# Patient Record
Sex: FEMALE | Race: WHITE
[De-identification: names, ages, dates, MRNs, and addresses within clinical notes are randomized per-mention and may not be internally consistent; named-entity substitution may affect disease eponyms.]

---

## 2017-01-11 NOTE — GHP
[f rep st]



                                                            HISTORY AND PHYSICAL





DATE OF ADMISSION:  2017



CHIEF COMPLAINT:  Brought in by friend.



HISTORY OF PRESENT ILLNESS:  This 71-year-old female who was found by a friend to be living in [facility], brought her in to see her primary care physician, Dr. Christina, today.  Apparently in Dr. Garrett chicas's office, she was acting quite abnormal, thus she did not feel that she was safe to return home. 
 She was thus sent to the ED for further evaluation.  According to Dr. Christina, she has had about a 2 y
ear history of dementia, the progression of which has accelerated significantly over the last 2 month
s. 



To me, she is calm.  She is actually alert and oriented x3.  She is answering questions appropriately
.  It is not clear exactly why she was here or have a good understanding of her medical problems to p
rovide a good history.  

A review of systems is quite negative overall, other than a dry cough. 



Apparently, Adult Protective Services has been involved in her care.  It is unclear exactly where the
y are at with her at this point.



PAST MEDICAL/SURGICAL HISTORY:  Hypothyroid, chronic neck pain, basal cell carcinoma.



MEDICATIONS:  Please see medication reconciliation.



ALLERGIES:  None.



FAMILY HISTORY:  Parents are .



SOCIAL HISTORY:  As per HPI.



REVIEW OF SYSTEMS:  A 10-point review of systems is conducted and is negative, except per HPI.



PHYSICAL EXAM:  VITAL SIGNS:  Blood pressure is 123/64, heart rate 65, respiration rate 14, saturatin
g 96% on room air, temperature is 36.7.  GENERAL:  The patient is a pleasant female who appears comfo
rtable, in no acute distress.  HEENT:  Shows her to be normocephalic, atraumatic.  There is no sclera
l icterus.  CARDIOVASCULAR:  Regular rate and rhythm.  No murmurs, rubs, or gallops.  PULMONARY:  Jolene
gs clear bilaterally.  ABDOMEN:  Soft, nontender, nondistended.  SKIN:  Shows her to have mild erythe
ma and scaling on her bilateral lower extremities.  :  No Singh.  NEUROLOGIC:  Shows her to be aler
t and oriented x3.  She is moving all extremities.  She has an overall nonfocal neurologic exam.  PSY
CHIATRIC:  Shows a normal mood and affect.



LABS:  CBC is relatively unremarkable.  Sodium is 133.  Urinalysis shows trace ketones, otherwise neg
ative.  I discussed this with Dr. Koehler, will admit for further care.



IMAGING:  I reviewed her head CT scan which shows nothing acute.  It does show some mild atrophy.



IMPRESSION AND PLAN:  This is a 71-year-old female admitted with essentially failure to thrive.

1.  Failure to thrive.  She was found by her friend living in [facility], sent in by her primary care ph
ysician who was very concerned about her.  This is Dr. Christina.  Will need to involve Case Management. 
 Apparently, Adult Protective Services has been involved in her care.

2.  Dementia.  Per PCP report, this has been accelerating.  I have checked a TSH, though this was onl
y slightly elevated at 6 about 2 months ago.  I have also checked a B12.

3.  Mild hyponatremia.  Will give her a bolus of 1 L, but not give her continuous IV fluids so that w
e do not tether her and cause worse in-hospital delirium.

4.  Hypothyroid.  As above, check TSH, will continue her medications.

5.  Code status.  Will need to clarify this.  I will empirically make her full code.  She did affirm 
that she has a living will.

6.  Venous thromboembolism risk.  She is at moderate risk.  I will give her Lovenox for venous thromb
oembolism prophylaxis.





Job #:  [id number]

## 2017-01-11 NOTE — CT
CT Brain (Without Contrast)   1438 hours

 

History: Acute confusion.  

 

Comparison: CT November 2012.

 

Technique: Axial computed tomographic images of the brain without contrast. Dose reduction techniques
 were utilized.

 

Findings:  Ventricles, cisterns, and sulci are widened consistent with atrophy. No hydrocephalus, mid
line shift/herniation, or epidural/subdural hematomas. No acute intraparenchymal hemorrhage or mass e
ffect. Cerebrovascular atherosclerosis.    Bone windows demonstrate no displaced fractures. Paranasal
 sinuses and mastoid air cells are clear.

 

Impression:    

1. Mild atrophy.

2. No acute hemorrhage, hydrocephalus, or mass effect. 

3. Cerebrovascular atherosclerosis.

4. No definite acute infarct.

5. No epidural or subdural hematoma.

6. Consider MRI of the brain without and with contrast enhancement, if there is continued clinical co
ncern.

 

 

Findings and recommendations discussed with Emergency Department physician, Dr. Willian Koehler, at 14
45 hours today.

 

Final report concurs with initial preliminary interpretation.

## 2017-01-11 NOTE — EDPHY
H & P


Stated Complaint: Per Dr Christina failure to thrive


Time Seen by Provider: 01/11/17 12:50


HPI/ROS: 


CHIEF COMPLAINT:  Failure to thrive





HISTORY OF PRESENT ILLNESS:  The patient presents to the emergency department 

with failure to thrive.  She has a history of expressive dementia.  She 

apparently has been living in [facility].  She had a friend bring her to her 

primary care provider's office today where she was noted to be disheveled, 

inappropriately undressing a markedly confused.  The patient is unable to tell 

me which medications she takes.  The patient does deny any acute complaints of 

pain.  She is extremely disoriented and does not know time or place.





REVIEW OF SYSTEMS:


A comprehensive 10 point review of systems is unobtainable secondary to her 

dementia


Source: Patient


Exam Limitations: No limitations





- Personal History


Current Tetanus/Diphtheria Vaccine: Unsure


Current Tetanus Diphtheria and Acellular Pertussis (TDAP): Unsure





- Medical/Surgical History


Hx Asthma: No


Hx Chronic Respiratory Disease: No


Hx Diabetes: No


Hx Cardiac Disease: No


Hx Renal Disease: No


Hx Cirrhosis: No


Hx Alcoholism: No


Hx HIV/AIDS: No


Hx Splenectomy or Spleen Trauma: No


Other PMH: unable to give information





- Social History


Smoking Status: Never smoked





- Physical Exam


Exam: 


General Appearance:  Thin frail cachectic woman, no acute distress


Eyes:  Pupils equal and round no pallor or injection


ENT, Mouth:  Mucous membranes moist


Respiratory:  There are no retractions, lungs are clear to auscultation


Cardiovascular:  Regular rate and rhythm


Gastrointestinal:  Abdomen is soft and nontender, no masses, bowel sounds normal


Neurological:  Alert and oriented x1, 5/5 strength noted all 4 extremities, 

cranial nerves 2-12 intact


Skin:  Warm and dry, no rashes


Musculoskeletal:  Neck is supple nontender


Extremities:  symmetrical, full range of motion








Constitutional: 


 Initial Vital Signs











Temperature (C)  37.0 C   01/11/17 12:19


 


Heart Rate  83   01/11/17 12:19


 


Respiratory Rate  16   01/11/17 12:19


 


Blood Pressure  122/63 H  01/11/17 12:19


 


O2 Sat (%)  95   01/11/17 12:19








 











O2 Delivery Mode               Room Air














Allergies/Adverse Reactions: 


 





No Known Allergies Allergy (Verified 01/11/17 12:25)


 








Home Medications: 














 Medication  Instructions  Recorded


 


oxyCODONE IR [Oxycodone Ir (*)] 2.5 mg PO DAILY@1800 PRN 10/08/12


 


Acetaminophen [Tylenol  mg 500 mg PO TID 01/11/17





(*)]  


 


Diclofenac Sodium [Voltaren Gel 2 gm TP QID 01/11/17





(*)]  


 


Donepezil HCl [Aricept 5 MG (*)] 5 mg PO HS 01/11/17


 


Duloxetine HCl [Cymbalta] 20 mg PO BID 01/11/17


 


Estradiol [Estrace Vaginal (*)] 0.125 maria VG Q4D@21 01/11/17


 


Glucosamine/Chondroitin 1 each PO DAILY 01/11/17





[Glucosamine/Chondroitin (*)]  


 


Herbals/Supplements -Info Only 1 ea PO DAILY 01/11/17


 


Ibuprofen [Motrin (*)] 200 mg PO BID 01/11/17


 


Levothyroxine [Synthroid 150 mcg 150 mcg PO CLARK 01/11/17





(*)]  


 


Levothyroxine [Synthroid 75 mcg 75 mcg PO MOTUWETHFRSA 01/11/17





(*)]  


 


Melatonin [Melatonin 3 MG (*)] 3 mg PO HS 01/11/17


 


traZODone [traZODONE 50MG (*)] 50 - 100 mg PO HS PRN 01/11/17














Medical Decision Making





- Diagnostics


Imaging: 


CT head without contrast:  Atrophy noted, no acute intracranial process present.


ED Course/Re-evaluation: 


The patient is referred to the emergency department from her primary care 

provider Dr. Savanna Christina.  The patient is unable to provide much history 

but in speaking with Dr. Christina, the patient has had fairly significant 

progression of her dementia and now has significant failure to thrive.  The 

patient is currently being investigated through the Adult protective Services.  

In the emergency department, the patient is noted to be in no acute distress 

she is quite deconditioned and confused.  The patient's head CT scan 

demonstrates no evidence of an acute intracranial process.  The patient's 

laboratory studies do not demonstrate an obvious acute explanation for 

encephalopathy.





Consultation is made with the hospitalist service for admission.  She will be 

admitted by Dr. Erick Billings this evening.


Differential Diagnosis: 


Differential diagnosis considered includes intracranial hemorrhage, urinary 

tract infection, dehydration, metabolic abnormality, progressive dementia





- Data Points


Laboratory Results: 


 Laboratory Results





 01/11/17 13:10 





 01/11/17 13:10 





 











  01/11/17 01/11/17





  13:10 12:45


 


WBC  5.02 10^3/uL  





   (3.80-9.50)  


 


RBC  3.90 L 10^6/uL  





   (4.18-5.33)  


 


Hgb  12.6 g/dL  





   (12.6-16.3)  


 


Hct  36.8 L %  





   (38.0-47.0)  


 


MCV  94.4 fL  





   (81.5-99.8)  


 


MCH  32.3 pg  





   (27.9-34.1)  


 


MCHC  34.2 g/dL  





   (32.4-36.7)  


 


RDW  12.3 %  





   (11.5-15.2)  


 


Plt Count  254 10^3/uL  





   (150-400)  


 


MPV  8.5 L fL  





   (8.7-11.7)  


 


Neut % (Auto)  78.1 H %  





   (39.3-74.2)  


 


Lymph % (Auto)  13.3 L %  





   (15.0-45.0)  


 


Mono % (Auto)  7.0 %  





   (4.5-13.0)  


 


Eos % (Auto)  0.2 L %  





   (0.6-7.6)  


 


Baso % (Auto)  0.6 %  





   (0.3-1.7)  


 


Nucleat RBC Rel Count  0.0 %  





   (0.0-0.2)  


 


Absolute Neuts (auto)  3.92 10^3/uL  





   (1.70-6.50)  


 


Absolute Lymphs (auto)  0.67 L 10^3/uL  





   (1.00-3.00)  


 


Absolute Monos (auto)  0.35 10^3/uL  





   (0.30-0.80)  


 


Absolute Eos (auto)  0.01 L 10^3/uL  





   (0.03-0.40)  


 


Absolute Basos (auto)  0.03 10^3/uL  





   (0.02-0.10)  


 


Absolute Nucleated RBC  0.00 10^3/uL  





   (0-0.01)  


 


Immature Gran %  0.8 %  





   (0.0-1.1)  


 


Immature Gran #  0.04 10^3/uL  





   (0.00-0.10)  


 


Sodium  133 L mEq/L  





   (134-144)  


 


Potassium  3.6 mEq/L  





   (3.5-5.2)  


 


Chloride  99 mEq/L  





   ()  


 


Carbon Dioxide  24 mEq/l  





   (22-31)  


 


Anion Gap  10 mEq/L  





   (8-16)  


 


BUN  22 mg/dL  





   (7-23)  


 


Creatinine  0.7 mg/dL  





   (0.6-1.0)  


 


Estimated GFR  > 60   





   


 


Glucose  94 mg/dL  





   ()  


 


Calcium  9.5 mg/dL  





   (8.5-10.4)  


 


Urine Color    YELLOW 





   


 


Urine Appearance    MODERATELY TURBID 





   


 


Urine pH    6.0 





    (5.0-7.5) 


 


Ur Specific Gravity    1.014 





    (1.002-1.030) 


 


Urine Protein    NEGATIVE 





    (NEGATIVE) 


 


Urine Ketones    TRACE H 





    (NEGATIVE) 


 


Urine Blood    NEGATIVE 





    (NEGATIVE) 


 


Urine Nitrate    NEGATIVE 





    (NEGATIVE) 


 


Urine Bilirubin    NEGATIVE 





    (NEGATIVE) 


 


Urine Urobilinogen    NEGATIVE EU





    (0.2-1.0) 


 


Ur Leukocyte Esterase    NEGATIVE 





    (NEGATIVE) 


 


Ur Culture Indicated?    NOT INDICATED 





    (NI) 


 


Urine Glucose    NEGATIVE 





    (NEGATIVE) 














Departure





- Departure


Disposition: [facility] Inpatient Acute


Clinical Impression: 


 Dementia, Failure to thrive


Condition: Fair

## 2017-01-12 NOTE — HOSPPROG
Hospitalist Progress Note


Assessment/Plan: 


Patient is a 71-year-old female who was brought to see her primary care 

provider yesterday.  Patient has a history of dementia which has been 

progressing.  There is concern about her in her home environment.  Adult 

protective Services is involved.  Today is my 1st encounter with the patient.  

Chart reviewed.





#.  failure to thrive


- speech therapy, physical therapy and occupation therapy to further evaluate 

her


- she has been declining steadily and Adult protective Services have been 

involved with her


-  BMI is 16 / concern she is not getting adequate intake





#.  hyponatremia


- resolved with fluids





#. anemia


- hemoglobin hematocrit lower than her baseline


- will heme her stools





#.  dementia


- has been noted by her PCP not to be decisional


- ethics to see hopefully today to help arrange for a proxy


- asked Neurology to further evaluate


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


- history of subdural hematoma in October 2012 /went to [place] [id number] rehab after 

this occurrence





#.  hypothyroidism / TSH is low at  0.4


-hold  Synthroid





#.  chronic neck pain with associated degenerative disc disease


- patient states that her neck always hurts





#.  DVT prophylaxis.  Low molecular weight heparin.





Plan:  Ethics to evaluate her/  she answers my questions appropriately but has 

no insight.  In addition I have asked for Neurology to evaluate her. 





Subjective: pat has no specific complaints.  She is complaining that there is 

too much noise in the hospital and would rather I left her alone.


Objective: 


 Vital Signs











Temp Pulse Resp BP Pulse Ox


 


 36.7 C   70   16   119/66   95 


 


 01/12/17 07:25  01/12/17 07:25  01/12/17 07:25  01/12/17 07:25  01/12/17 07:25








 Laboratory Results





 01/12/17 04:29 





 01/12/17 04:29 





 











 01/11/17 01/12/17 01/13/17





 05:59 05:59 05:59


 


Intake Total  350 


 


Balance  350 














- Physical Exam


Constitutional: no apparent distress, cachectic, No not in pain ( neck area)


Eyes: PERRL


Ears, Nose, Mouth, Throat: hearing normal


Cardiovascular: regular rate and rhythym


Respiratory: no respiratory distress


Gastrointestinal: normoactive bowel sounds


Skin: warm


Musculoskeletal: full muscle strength


Neurologic: other ( alert and oriented to herself and her friend at the 

bedside.  Knows she is in the hospital.  Does not know why she is here.  And 

prefers that I quit talking with her)


Psychiatric: thought process linear, poor insight, poor judgement





ICD10 Worksheet


Patient Problems: 


 Problems











Problem Status Diagnosed


 


Dementia Acute 


 


Failure to thrive Acute

## 2017-01-13 NOTE — HOSPPROG
Hospitalist Progress Note


Assessment/Plan: 


Patient is a 71-year-old female who was brought to see her primary care 

provider yesterday.  Patient has a history of dementia which has been 

progressing.  There is concern about her in her home environment.  Adult 

protective Services is involved.  





#.  failure to thrive


- speech therapy, physical therapy and occupation have seen her


- she has been declining steadily and Adult protective Services have been 

involved with her


-  BMI is 16 / concern she is not getting adequate intake


- she is eating well here





#.  hyponatremia


- resolved with fluids





#. anemia


- hemoglobin hematocrit lower than her baseline/repeat labs today show 

improvement


- will heme her stools





#.  dementia


- has been noted by her PCP not to be decisional


- ethics arranging for a proxy


- appreciate neurology/ she has dementia and progressive decline


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


- history of subdural hematoma in October 2012 /went to [place] [id number] rehab after 

this occurrence





#.  hypothyroidism / TSH is low at  0.4


-hold  Synthroid





#.  chronic neck pain with associated degenerative disc disease


- patient states that her neck always hurts


-no complaints today





#.  DVT prophylaxis.  Low molecular weight heparin.





Plan:  will continue care in the hospital/ she wanders frequently, but comes 

back to her room with direction.  Pat is unable to appreciate why she is her, 

she has poor insight and reasoning (while ST was working with her, she was 

unable to focus, understand how to use a pill box), she is tangential, unable 

to focus or rationalize. She perseverates on being cold and doesn't want to 

shake my hand because it may be cold. She is not decisional today when I 

evaluated her.  


Subjective: Pat has no complaints, but is cold.


Objective: 


 Vital Signs











Temp Pulse Resp BP Pulse Ox


 


 36.6 C   95   18   121/69 H  96 


 


 01/13/17 07:38  01/13/17 07:38  01/13/17 07:38  01/13/17 07:38  01/13/17 07:38








 Laboratory Results





 01/13/17 04:53 





 01/12/17 04:29 





 











 01/12/17 01/13/17 01/14/17





 05:59 05:59 05:59


 


Intake Total 350 1400 


 


Balance 350 1400 














- Physical Exam


Constitutional: no apparent distress, cachectic


Eyes: PERRL


Ears, Nose, Mouth, Throat: hearing normal


Respiratory: no respiratory distress


Skin: warm


Musculoskeletal: full muscle strength


Neurologic: other (alert and oriented to herself and place, but difficult to 

get her to focus on answering questions/she changes the topic frequently)


Psychiatric: interacting appropriately, poor insight, poor judgement





ICD10 Worksheet


Patient Problems: 


 Problems











Problem Status Diagnosed


 


Dementia Acute 


 


Failure to thrive Acute

## 2017-01-13 NOTE — PDCONSULT
Consultant Note: 


HOSPITAL NEUROLOGY CONSULT


REQUESTING: Vonnie Delacruz NP


REASON: possible dementia





HPI:





This is a 71-year-old right-handed woman with a associates degree who presented 

to our facility on January 11th with concerns for her cognitive status and well 

being.





The patient was brought to our facility for multiple concerns.  Her primary 

care provider has been concerned over the years about a progressive dementing 

process.  She was apparently found wandering the streets in her neighborhood 

without any clothes on and subsequently her home environment was survey and was 

found to be in a state of discord.  Adult protective Services has been  

investigating her ability to care for herself in her well-being.  There has 

also been concerned about protein calorie malnutrition as the patient has been 

losing weight.  She was brought here for further evaluation of her cognitive 

status as well as stabilization of her nutrition and for possible placement.





On my interview, the patient does not have any insight into her cognitive 

deficits.  She is unsure why she is admitted to our facility.  She continually 

refers to a friends who knows about her history and also refers to the 

medical chart for any sort of historical background.  She has been pacing 

around her room and has been asking to wander around the halls.  She is mainly 

concerned about the temperature of her room and staying warm, specifically 

perseverating on the temperature of the floor.  Otherwise, I cannot elicit any 

significant history from the patient due to her poor insight and judgment.





Her medical screening has been unremarkable for any toxic/metabolic/infectious 

disturbance.





ROS:


As per the HPI, otherwise a complete 12 point ROS was performed and is negative





ALLERGIES AND MEDS:


As recorded in the EMR - reviewed and reconciled





PFSH:


As per the intake H&P by Dr. Billings from 1/11/17





EXAM:


GEN: cachectic woman pacing about her room in NAD


HEENT: bitemporal wasting, sclera anicteric, conjunctiva not injected, MMM, 

oropharynx clear, no scalp tenderness


NECK: supple, nontender, no meningismus


CV: RRR s1 s2 wo m/r/c/g.  Carotid pulses 2+ wo bruit


NEURO:


MS: awake, alert, oriented to all spheres except sitaution and numerical date.  

Speech nondysarthric.  No language disturbance.  Follows commands.  Attends to 

both sides.  Clear episodic/recent memory dysfunction.  Mood euthymic.  

Adequate fund of knowledge.  Poor insight and judgement.  MoCA 17/30 (-1 clock 

draw hands, -5 attention, -1 language repeating, -5 recall, -1 date).  She is 

easily distracted and tangential.  Very poor attention.


CN: pupils 3mm round and reactive.  Intolerant of fundoscopy.  VFF.  Primary 

gaze centered.  Full ocular motility with motor impersistence and saccadic 

intrusion on smooth pursuit.  Facial sensation preserved.  Face symmetric.  

Palatoglossal movements intact.  Shoulder shrug and head turn strong.


MOTOR: reduced bulk throughout. Normal tone. Fine postural tremor without 

intention worsening.  Full power throughout.


SENSORY: intact to all modalities throughout.  No extinction.


COORD: no ataxia FN/HS.  Estela preserved.  Romberg neg.


REFLEX: plantars down.  No clonus.  DTRS trace.


GAIT: rises unassisted.  Narrow base.  Intact stride length/heel strike/toe lift

/arm swing.  Turns with 2 steps.  











DATA:


Labs reviewed in EMR.  B12 and TSH checked


CT head wo reviewed - mild volume loss, nothing acute





IMPRESSION AND RECOMMENDATIONS:





// DEMENTIA


// FAILURE TO THRIVE


// HYPOTHYROIDISM - ON REPLACEMENT





Patient with concerns of progressive cognitive decline.  She clearly has 

attentional issues and memory dysfunction.  She has no insight and poor 

judgement.  She tends to wander, as well.  I think concern for her ability to 

care for herself is valid.  She is high risk for elopement in any environment 

and I think she does pose a safety risk if left to her own devices (such as 

leaving stove burners on, leaving faucets running, poor hygiene, unable to 

maintain clean/safe living environment, wandering into unsafe situations).  

Specific subtype of dementia unclear, but likely Alzheimer type pathology.





I recommend placement in a supervised living facility.  


Delirium precautions - lights on/window shade up from [id number], frequent 

orientation, sensory optimization, regular mealtimes, OOB to chair, regular 

supervised ambulation, family/friends at bedside, nonpharmacologic sleep 

enhancement with cool/quiet/dark room at night.


Avoid sedating/disinhibiting drugs.


Nutritional optimization

## 2017-01-14 NOTE — HOSPPROG
Hospitalist Progress Note


Assessment/Plan: 


Patient is a 71-year-old female who was brought to see her primary care 

provider yesterday.  Patient has a history of dementia which has been 

progressing.  There is concern about her in her home environment.  Adult 

protective Services is involved.  





#.  failure to thrive


- speech therapy, physical therapy and occupation have seen her


- she has been declining steadily and Adult protective Services have been 

involved with her


- BMI is 16 / concern she is not getting adequate intake


- she is eating well here





#.  hyponatremia


- resolved with fluids





#. anemia


- hemoglobin hematocrit lower than her baseline/repeat labs today show 

improvement


- will heme her stools





#.  dementia


- has been noted by her PCP not to be decisional


- ethics arranging for a proxy


- appreciate neurology/ she has dementia and progressive decline


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


- history of subdural hematoma in October 2012 /went to [place] [id number] rehab after 

this occurrence





#.  hypothyroidism / TSH is low at  0.4


-hold  Synthroid





#.  chronic neck pain with associated degenerative disc disease


- patient states that her neck always hurts


- no complaints t





#.  DVT prophylaxis.  Low molecular weight heparin.





Plan:  no change/ she is alert,calm and appropriate/ continues to be tangental. 


Subjective: Pat has no complaints/ ambulates frequently


Objective: 


 Vital Signs











Temp Pulse Resp BP Pulse Ox


 


 37.0 C   79   16   116/57 L  98 


 


 01/14/17 16:00  01/14/17 16:00  01/14/17 16:00  01/14/17 16:00  01/14/17 16:00








 Laboratory Results





 01/13/17 04:53 





 01/12/17 04:29 





 











 01/13/17 01/14/17 01/15/17





 05:59 05:59 05:59


 


Intake Total 1400 800 


 


Balance 1400 800 














- Physical Exam


Constitutional: no apparent distress, other (thin)


Eyes: PERRL


Respiratory: no respiratory distress


Gastrointestinal: normoactive bowel sounds


Skin: warm


Musculoskeletal: full muscle strength


Neurologic: other (alert and oriented to herself, place)


Psychiatric: interacting appropriately, poor insight, poor judgement





ICD10 Worksheet


Patient Problems: 


 Problems











Problem Status Diagnosed


 


Dementia Acute 


 


Failure to thrive Acute

## 2017-01-15 NOTE — HOSPPROG
Hospitalist Progress Note


Assessment/Plan: 


Patient is a 71-year-old female who was brought to see her primary care 

provider yesterday.  Patient has a history of dementia which has been 

progressing.  There is concern about her in her home environment.  Adult 

protective Services is involved.  





#.  failure to thrive


- speech therapy, physical therapy and occupation have seen her


- she has been declining steadily and Adult protective Services have been 

involved with her


- BMI is 16 / concern she is not getting adequate intake


- she is eating well here





#.  hyponatremia


- resolved with fluids





#. anemia


- hemoglobin hematocrit lower than her baseline/repeat labs show improvement


- will heme her stools





#.  dementia


- has been noted by her PCP not to be decisional


- ethics arranging for a proxy


- appreciate neurology/ she has dementia and progressive decline


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


- history of subdural hematoma in October 2012 /went to [place] [address] rehab after 

this occurrence





#.  hypothyroidism / TSH is low at  0.4


-hold  Synthroid





#.  chronic neck pain with associated degenerative disc disease


- patient states that her neck always hurts


- no complaints t





#.  DVT prophylaxis.  Low molecular weight heparin.





Plan:  no change/ she is alert,calm and appropriate/ continues to be tangental. 

She is clearly not decisional/ she is oriented, but has no insight.


Subjective: patient says she didn't sleep well due to pain but unable to tell 

me where.


Objective: 


 Vital Signs











Temp Pulse Resp BP Pulse Ox


 


 36.6 C   80   12   124/70 H  97 


 


 01/15/17 08:29  01/15/17 08:29  01/15/17 08:29  01/15/17 08:29  01/15/17 08:29








 Laboratory Results





 01/13/17 04:53 





 01/12/17 04:29 





 











 01/14/17 01/15/17 01/16/17





 05:59 05:59 05:59


 


Intake Total 800  


 


Balance 800  














- Physical Exam


Constitutional: no apparent distress, other (thin)


Eyes: PERRL


Ears, Nose, Mouth, Throat: hearing normal


Respiratory: no respiratory distress


Skin: warm


Musculoskeletal: full muscle strength


Neurologic: other (alert and oriented , pacing frequently, changes the subject 

frequently)


Psychiatric: not anxious





ICD10 Worksheet


Patient Problems: 


 Problems











Problem Status Diagnosed


 


Dementia Acute 


 


Failure to thrive Acute

## 2017-01-16 NOTE — NEUROPROG
Assessment: 


Dementia with psychogenic unresponsiveness perhaps triggered by stress of 

recent events. There is not evidence for seizures or stroke. Unfortunately, 

there is nothing to do for now except monitor and support her and this will 

likely resolve on its own. 


Subjective: 


Pt case reviewed and consult from Dr. Harjit santiago from a few days ago. She has 

become unresponsive today, and I was asked to see her to be sure that there is 

no an acute neurologic disorder. The patient is not speaking and generally not 

following commands, but she has had some purposeful reactions.


Objective: 





 Vital Signs











Temp Pulse Resp BP Pulse Ox


 


 36.3 C   68   18   152/73 H  97 


 


 01/16/17 15:25  01/16/17 15:25  01/16/17 15:25  01/16/17 15:25  01/16/17 15:25








 Laboratory Results





 01/16/17 10:30 





 01/16/17 10:30 





The patient is awake with her eyes closed and non verbal. She is clearly able 

to control her upper extremities by not letting the arms fall to her face and 

purposefully moves them to her side when I lift them. She has resistance to my 

attempt to passively open her eyes. Pupils are 3mm and reactive. She did open 

her mouth for me partially. There is at least antigravity strength in all the 

extremities. Reflexes 2+. No Babinski signs.





CT head was reviewed and looks stable with chronic microvascular disease 

changes.


Allergies/Adverse Reactions: 


 





No Known Allergies Allergy (Verified 01/11/17 12:25)

## 2017-01-16 NOTE — HOSPPROG
Hospitalist Progress Note


Assessment/Plan: 


Patient is a 71-year-old female who was brought to see her primary care 

provider yesterday.  Patient has a history of dementia which has been 

progressing.  There is concern about her in her home environment.  Adult 

protective Services is involved.  





#. Change in  change in level of consciousness


- patient was up earlier today but now will not interact


- when I try to open her eyes she squints and pulls her eyelids down


- she does not pull away from a sternal rub but responded when ice was placed 

on her left groin area


- not likely a stroke /will get a stat CT now, glucose is stable, will get a 

CBC and complete metabolic panel


-spoke with Dr Owen and he will re-evaluate her





#.  failure to thrive


- speech therapy, physical therapy and occupation have seen her


- she has been declining steadily and Adult protective Services have been 

involved with her


- BMI is 16 / concern she is not getting adequate intake


- she is eating well here





#.  hyponatremia


- resolved with fluids





#. anemia


- hemoglobin hematocrit lower than her baseline





#.  dementia


- has been noted by her PCP not to be decisional


- ethics arranging for a proxy


- appreciate neurology/ she has dementia and progressive decline


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


- history of subdural hematoma in October 2012 /went to [place] [address] rehab after 

this occurrence





#.  hypothyroidism / TSH is low at  0.4


-hold  Synthroid





#.  chronic neck pain with associated degenerative disc disease


- patient states that her neck always hurts


- no complaints t





#.  DVT prophylaxis.  Low molecular weight heparin.





Plan: get a stat CT scan and labs, Dr Owen to see later today/ when stable

, can go to [facility] memory care unit/ a bed is available


Subjective: Pat is not interacting with me.


Objective: 


 Vital Signs











Temp Pulse Resp BP Pulse Ox


 


 36.3 C   88   16   139/68 H  96 


 


 01/16/17 07:46  01/16/17 09:58  01/16/17 09:58  01/16/17 09:58  01/16/17 09:58








 Laboratory Results





 01/13/17 04:53 





 01/12/17 04:29 











- Physical Exam


Constitutional: no apparent distress (vital signs are stable)


Cardiovascular: regular rate and rhythym


Respiratory: no respiratory distress


Skin: warm


Neurologic: No facial droop


Psychiatric: other (not responding, squints eyelids when I try to open her eyes

, when arm is lifted above her head, she places it to her side.)





ICD10 Worksheet


Patient Problems: 


 Problems











Problem Status Diagnosed


 


Dementia Acute 


 


Failure to thrive Acute

## 2017-01-16 NOTE — CT
CT Head Without Contrast

 

History: Unresponsive.

 

Comparison: CT head January 11, 2017.

 

Technique: Axial unenhanced images were obtained from the vertex through the skull base.  Dose reduct
ion techniques were utilized.

 

Findings: Gray-white differentiation is preserved. There is mild diffuse cerebral atrophy with scatte
red periventricular and subcortical low attenuation, suggesting chronic microvascular ischemic gliosi
s. No intracranial hemorrhage is identified.  No extraaxial fluid collections are identified.  There 
is no mass, mass effect or evidence of infarct. Atherosclerotic calcification is present in the dista
l internal carotid and vertebral arteries. The skull and skull base are unremarkable.  The paranasal 
sinuses and mastoid air cells are normally aerated. 

 

Impression:

1. No acute intracranial findings.

2. Diffuse cerebral atrophy with scattered periventricular and subcortical low attenuation consistent
 with chronic microvascular ischemic gliosis.

 

Findings were communicated via secure Voalte text to Vonnie Delacruz today at 1117 hours.

## 2017-01-17 NOTE — GDS
[f 
rep st]



                                                             DISCHARGE SUMMARY





DISCHARGE DIAGNOSES:  

1.  Change in consciousness.

2.  Failure to thrive.

3.  Hyponatremia.

4.  Anemia.

5.  Dementia.

6.  Hypothyroidism.

7.  Chronic neck pain.



PROCEDURES DONE:  

1.  CT of the head.

2.  Repeat CT of the head.



CONSULTATIONS:  Neurology.



PHYSICAL EXAM:  GENERAL:  The patient is alert.  VITAL SIGNS:  Afebrile at 36.3
, pulse is 68, respiratory rate 14, blood pressure is 164/84, saturating 98% on 
room air.  I have seen and evaluated the patient on the day of discharge.



HOSPITAL COURSE:  

1.  The patient is a 71-year-old female who was brought to the emergency room 
by her primary care physician.  She was evaluated and diagnosed with failure to 
thrive.  This has been an ongoing problem for the patient.  Her BMI is 16.  She 
has been evaluated by speech therapy as well as physical therapy and 
occupational therapy.  She is not felt to be safe to be discharged home.

2.  Change in consciousness.  The patient was evaluated by Neurology.  It was 
felt that this was a stress-induced response.  It is completely resolved prior 
to disposition.

3.  Hyponatremia.  This is resolved with fluids and was secondary to 
dehydration.

4.  Anemia.  This is multifactorial and close to the patient's baseline.

5.  Dementia.  Ethics has arranged a proxy during this hospitalization who will 
assist in helping the patient make decisions.

6.  Hypothyroidism.  Her Synthroid dose has been adjusted during this hospital 
course secondary to a low TSH.



DISPOSITION:  The patient will be discharged to a locked memory unit with 
further intervention to be determined in the outpatient setting.  She will 
follow up with her primary care physician, Dr. Kay Christina.



DISCHARGE MEDICATIONS:  Please refer to EMR form.



TIME SPENT:  I spent greater than 35 minutes in the care, coordination, and 
management of this patient's disposition.





Job #:  [id number]

MTDD

## 2017-01-17 NOTE — PDIAF
- Diagnosis


Diagnosis: FTT


Code Status: Full Code





- Medication Management


Discharge Medications: 


 Medications to Continue on Transfer





oxyCODONE IR [Oxycodone Ir (*)] 5 mg PO Q8HRS PRN 10/08/12 [Last Taken 09/06/13 

11:00]


Diclofenac Sodium [Voltaren Gel (*)] 2 gm TP QID 01/11/17 [Last Taken Unknown]


Donepezil HCl [Aricept 5 MG (*)] 5 mg PO HS 01/11/17 [Last Taken Unknown]


Duloxetine HCl [Cymbalta] 20 mg PO BID 01/11/17 [Last Taken Unknown]


Estradiol [Estrace Vaginal (*)] 0.125 maria VG Q4D@21 01/11/17 [Last Taken Unknown

]


Glucosamine/Chondroitin [Glucosamine/Chondroitin (*)] 1 each PO DAILY 01/11/17 [

Last Taken Unknown]


Herbals/Supplements -Info Only 1 ea PO DAILY 01/11/17 [Last Taken Unknown]


Ibuprofen [Motrin (*)] 200 mg PO BID 01/11/17 [Last Taken Unknown]


Levothyroxine [Synthroid 75 mcg (*)] 75 mcg PO MOTUWETHFRSA 01/11/17 [Last 

Taken Unknown]


Melatonin [Melatonin 3 MG (*)] 3 mg PO HS 01/11/17 [Last Taken Unknown]


traZODone [traZODONE 50MG (*)] 50 - 100 mg PO HS PRN 01/11/17 [Last Taken 

Unknown]


Acetaminophen [Tylenol 325mg (*)] 650 mg PO Q4HRS PRN #0 tab 01/17/17 [Last 

Taken Unknown]








Discharge Medications: Refer to the Discharge Home Medication list for PRN 

reason.


PICC Care - Routine: N/A





- Orders


Services needed: Registered Nurse, Physical Therapy, Occupational Therapy


Diet Recommendation: no restrictions on diet





- Follow Up Care


Current Providers and Referrals: 


Kay Christina MD [Primary Care Provider] - As per Instructions

## 2017-11-30 ENCOUNTER — HOSPITAL ENCOUNTER (OUTPATIENT)
Dept: HOSPITAL 80 - BMCIMAGING | Age: 72
End: 2017-11-30
Attending: FAMILY MEDICINE
Payer: COMMERCIAL

## 2017-11-30 DIAGNOSIS — Z12.31: Primary | ICD-10-CM

## 2017-11-30 PROCEDURE — G0202 SCR MAMMO BI INCL CAD: HCPCS

## 2018-01-31 ENCOUNTER — HOSPITAL ENCOUNTER (OUTPATIENT)
Dept: HOSPITAL 80 - CIMAGING | Age: 73
End: 2018-01-31
Payer: COMMERCIAL

## 2018-01-31 DIAGNOSIS — M41.85: Primary | ICD-10-CM

## 2018-01-31 DIAGNOSIS — B02.23: ICD-10-CM

## 2018-01-31 DIAGNOSIS — Z79.891: ICD-10-CM

## 2018-09-11 ENCOUNTER — HOSPITAL ENCOUNTER (EMERGENCY)
Dept: HOSPITAL 80 - FED | Age: 73
Discharge: HOME | End: 2018-09-11
Payer: COMMERCIAL

## 2018-09-11 VITALS — DIASTOLIC BLOOD PRESSURE: 78 MMHG | SYSTOLIC BLOOD PRESSURE: 125 MMHG

## 2018-09-11 DIAGNOSIS — S80.211A: ICD-10-CM
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
## 2018-09-11 DIAGNOSIS — S20.211A: ICD-10-CM
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
## 2018-09-11 DIAGNOSIS — K59.00: Primary | ICD-10-CM

## 2018-09-11 DIAGNOSIS — Z66: ICD-10-CM

## 2018-09-11 LAB
INR PPP: 0.99 (ref 0.83–1.16)
PLATELET # BLD: 296 10^3/UL (ref 150–400)
PROTHROMBIN TIME: 13.3 SEC (ref 12–15)

## 2018-09-11 PROCEDURE — 72125 CT NECK SPINE W/O DYE: CPT

## 2018-09-11 PROCEDURE — 71046 X-RAY EXAM CHEST 2 VIEWS: CPT

## 2018-09-11 PROCEDURE — 74177 CT ABD & PELVIS W/CONTRAST: CPT

## 2018-09-11 PROCEDURE — 70450 CT HEAD/BRAIN W/O DYE: CPT

## 2018-09-11 PROCEDURE — 99285 EMERGENCY DEPT VISIT HI MDM: CPT

## 2019-01-18 ENCOUNTER — HOSPITAL ENCOUNTER (OUTPATIENT)
Dept: HOSPITAL 80 - BMCIMAGING | Age: 74
End: 2019-01-18
Attending: GENERAL ACUTE CARE HOSPITAL
Payer: COMMERCIAL

## 2019-01-18 DIAGNOSIS — Z12.31: Primary | ICD-10-CM

## 2019-06-24 ENCOUNTER — HOSPITAL ENCOUNTER (EMERGENCY)
Dept: HOSPITAL 80 - FED | Age: 74
Discharge: HOME | End: 2019-06-24
Payer: COMMERCIAL

## 2019-06-25 ENCOUNTER — HOSPITAL ENCOUNTER (INPATIENT)
Dept: HOSPITAL 80 - FED | Age: 74
LOS: 3 days | Discharge: NURSING, REHAB OR LONG-TERM CARE | End: 2019-06-28
Payer: COMMERCIAL

## 2024-03-29 NOTE — EDPHY
HPI/HX/ROS/PE/MDM


Narrative: 





CHIEF COMPLAINT: Fall, constipation 





HISTORY OF PRESENT ILLNESS:





This patient is a 73-year-old female presenting following an unwitnessed 

mechanical fall early Saturday morning. She states she tripped over a rug. She 

did strike her head and sustained a small skin tear lateral to her right eye 

from her glasses. She denies any loss of consciousness. She complains of right-

sided rib pain. This  is exacerbated by movement. She endorses neck pain. 

Denies nausea or vomiting. Today, she and her caregiver called [facility] 

for evaluation due to continued discomfort. At that time, the patient was noted 

to have abdominal distention. Providers were concerned for constipation. The 

patient states her last bowel movement was about one week ago. Per the [facility] note, the patient has been leaking stool for the past week as well. The 

patient denies history of abdominal surgeries. She does wear a buprenorphine 

patch for pain. Her caretaker at bedside notes the patient had a dental 

procedure Wednesday with about 4 hours of IV sedation, in case this contributed 

to the patient's constipation. No fever, chills, chest pain, shortness of breath

, palpitations, vomiting, diarrhea, urinary complaints, headache, 

lightheadedness. 





HPI obtained partially from caretaker at bedside and [facility] 

report. Patient has history of TBI and is a poor historian. 





REVIEW OF SYSTEMS:


A comprehensive 10 system review of systems is otherwise negative aside from 

elements mentioned in the history of present illness and medical decision 

making.





PAST MEDICAL HISTORY: TBI.   





SOCIAL HISTORY: Caretaker at bedside. Lives at [facility] assisted 

living. Retired. 








VITAL SIGNS: Reviewed by me


GENERAL: Well-developed, well-nourished, resting comfortably in no respiratory 

distress.


HEENT: Skin tear lateral to right eye. Eyes: No icterus, no injection. Mouth: 

moist mucous membranes.  No erythema or lesions. Neck: supple with no 

adenopathy.


LUNGS: Crackles at right base, no wheezes.


CARDIAC: Regular rate and rhythm, no rubs, murmurs or gallops.


ABDOMEN: Abdominal distention and bilateral lower quadrant tenderness. Soft.


BACK: Buprenorphine transdermal patch on right shoulder. No CVA tenderness.


EXTREMITIES: Abrasion over right knee. No edema.  Range of motion is normal 

throughout.


NEURO: Alert and oriented,  grossly nonfocal.  


SKIN: Warm and dry, no rash.


PSYCHIATRIC: Normal mentation, no agitation.





Portions of this note were transcribed by a medical scribe.  I personally 

performed a history, physical exam, medical decision making, and confirmed 

accuracy of information the transcribed note. 





ED Course: 





72 y/o female presents with right-sided rib pain following a fall four days 

ago. She did strike her head, small skin tear present lateral to right eye. On 

exam, no ecchymosis or palpable deformities to her ribs. Crackles at right base

, no wheezes. Patient has abdominal distention and bilateral lower quadrant 

tenderness. IV established. Plan for chest x-ray, CT head/neck, CT abdomen, 

labs including CBC, chemistries. 





CXR negative for acute processes. No rib fractures, pleural effusions, 

infiltrates.





15:15 Spoke with Dr. Brooks, radiologist. CT head/cervical spine negative for 

acute processes. 





15:25 Spoke with Dr. Brooks. CT abdomen/pelvis shows distended bladder, 

distended rectum, stool impaction. 





Patient had an enema. I discussed the importance of a bowel protocol.  She and 

her caregiver would prefer to be discharged back to home to continue to address 

her significant constipation.  





Plan to discharge home in good condition. Follow up and return precautions 

discussed. Recommended magnesium citrate for constipation relief. The patient 

is comfortable with this plan. 


MDM: 





Diff dx considered included but not limited to rib fracture, pulmonary contusion

, intrathoracic injury, intracranial injury, cervical spine injury, abdominal 

SBO, obstipation, constipation.





- Data Points


Imaging Results: 








CT Abd Pelvis:


Impression: 1. Significant degree of rectal distention due to stool burden. 

Recommend disimpaction. The remainder of the colon contains moderate stool and 

there is likely an underlying element of constipation. 2. Distention of the 

bladder. 3. Small amount of fluid in the right pericolic gutter. 4. Mild extra 

and intrahepatic ductal dilatation. Consider correlating with LFTs and 

obtaining ERCP or MRCP if abnormal. 5. Bibasilar airspace opacities, probably 

atelectasis or infectious/inflammatory. Findings and recommendations discussed 

with Kristin Fair MD at 1524 hour, 9/11/2018. Dictated By: Meme Brooks MD 





CT Head and Cervical Spine:


Impression: No acute intracranial process or cervical spine fracture/

subluxation. Findings and recommendations discussed with Kristin Fair MD at 

1512 hour, 9/11/2018. Dictated By: Meme Brooks MD 





CXR:  


Impression: 1. No active cardiopulmonary disease seen. Dictated By: Ramy Norwood MD 


Imaging: Discussed imaging studies w/ On call Radiologist, I viewed and 

interpreted images myself


Laboratory Results: 


 Laboratory Results





 09/11/18 13:45 





 09/11/18 13:45 








Medications Given: 


 








Discontinued Medications





Sodium Chloride (Ns)  1,000 mls @ 0 mls/hr IV ONCE ONE; Wide Open


   PRN Reason: Protocol


   Stop: 09/11/18 13:10


   Last Admin: 09/11/18 13:47 Dose:  1,000 mls


Miscellaneous Information (Patch Removal)  1 ea TD DAILY21 BLAISE


   Stop: 03/10/19 20:59


   Last Admin: 09/11/18 14:43 Dose:  Not Given


Miscellaneous Medication (Icy Hot Lidocaine/Menthol 4%/1% Patch)  1 patch TD 

EDNOW ONE


   Stop: 09/11/18 13:10


   Last Admin: 09/11/18 13:47 Dose:  1 patch








General


Time Seen by Provider: 09/11/18 12:55


Initial Vital Signs: 


 Initial Vital Signs











Temperature (C)  37 C   09/11/18 12:40


 


Heart Rate  93   09/11/18 12:40


 


Respiratory Rate  18   09/11/18 12:40


 


Blood Pressure  107/74   09/11/18 12:40


 


O2 Sat (%)  94   09/11/18 12:40








 











O2 Delivery Mode               Room Air














Allergies/Adverse Reactions: 


 





No Known Allergies Allergy (Verified 09/11/18 12:36)


 








Home Medications: 














 Medication  Instructions  Recorded


 


Diclofenac Sodium 1% [Voltaren Gel 2 gm TP QID 01/11/17





(*)]  


 


Donepezil HCl [Aricept 5 MG (*)] 5 mg PO HS 01/11/17


 


Duloxetine HCl [Cymbalta] 20 mg PO BID 01/11/17


 


Estradiol [Estrace Vaginal (*)] 0.125 maria VG Q4D@21 01/11/17


 


Glucosamine/Chondroitin 1 each PO DAILY 01/11/17





[Glucosamine/Chondroitin (*)]  


 


Herbals/Supplements -Info Only 1 ea PO DAILY 01/11/17


 


Ibuprofen [Motrin (*)] 200 mg PO BID 01/11/17


 


Levothyroxine [Synthroid 75 mcg 75 mcg PO MOTUWETHFRSA 01/11/17





(*)]  


 


Melatonin [Melatonin 3 MG (*)] 3 mg PO HS 01/11/17


 


traZODone [traZODONE 50MG (*)] 50 - 100 mg PO HS PRN 01/11/17


 


Acetaminophen [Tylenol 325mg (*)] 650 mg PO Q4HRS PRN #0 tab 01/17/17


 


LYRICA  09/11/18


 


morphINE  09/11/18














Departure





- Departure


Disposition: Home, Routine, Self-Care


Clinical Impression: 


 Abdominal distention, History of fall





Constipation


Qualifiers:


 Constipation type: other constipation type Qualified Code(s): K59.09 - Other 

constipation





Rib contusion


Qualifiers:


 Encounter type: initial encounter Laterality: right Qualified Code(s): 

S20.211A - Contusion of right front wall of thorax, initial encounter





Condition: Good


Instructions:  Constipation (ED), Rib Contusion (ED)


Additional Instructions: 


I recommend lidocaine patches and high-dose Tylenol as needed for pain in your 

chest wall.





I will not increase your opiate as you have significant constipation.





I recommend magnesium citrate as treatment for the constipation.


Please take half of a bottle magnesium citrate and mix it with 16 oz of 

Gatorade.


Drink this over 15 min.


Wait 4 hr.





If you are not having significant stool output at that time, please repeat with 

the other half of the bottle magnesium citrate.





Please follow up with your primary care physician if you develop significant 

shortness of breath, worsening chest pain, lightheadedness, dizziness, blood in 

the stool, or other concerns.


Referrals: 


Kapil Atkinson MD [Primary Care Provider] - As per Instructions


Report Scribed for: Kristin Fair


Report Scribed by: Selina Causey


Date of Report: 09/11/18


Time of Report: 12:58 Statement Selected